# Patient Record
Sex: FEMALE | Race: WHITE | NOT HISPANIC OR LATINO | Employment: FULL TIME | URBAN - METROPOLITAN AREA
[De-identification: names, ages, dates, MRNs, and addresses within clinical notes are randomized per-mention and may not be internally consistent; named-entity substitution may affect disease eponyms.]

---

## 2021-05-26 ENCOUNTER — RECORDS - HEALTHEAST (OUTPATIENT)
Dept: ADMINISTRATIVE | Facility: CLINIC | Age: 71
End: 2021-05-26

## 2021-05-27 ENCOUNTER — RECORDS - HEALTHEAST (OUTPATIENT)
Dept: ADMINISTRATIVE | Facility: CLINIC | Age: 71
End: 2021-05-27

## 2021-12-22 ENCOUNTER — TRANSCRIBE ORDERS (OUTPATIENT)
Dept: OTHER | Age: 71
End: 2021-12-22

## 2021-12-22 DIAGNOSIS — I35.0 AORTIC STENOSIS, MODERATE: ICD-10-CM

## 2021-12-22 DIAGNOSIS — I25.10 CAD IN NATIVE ARTERY: Primary | ICD-10-CM

## 2021-12-28 ENCOUNTER — TRANSCRIBE ORDERS (OUTPATIENT)
Dept: OTHER | Age: 71
End: 2021-12-28

## 2022-01-14 ENCOUNTER — HOSPITAL ENCOUNTER (OUTPATIENT)
Dept: CARDIAC REHAB | Facility: CLINIC | Age: 72
End: 2022-01-14
Attending: INTERNAL MEDICINE
Payer: COMMERCIAL

## 2022-01-14 DIAGNOSIS — I35.0 AORTIC STENOSIS, MODERATE: ICD-10-CM

## 2022-01-14 DIAGNOSIS — I25.10 CAD IN NATIVE ARTERY: ICD-10-CM

## 2022-01-14 PROCEDURE — 93797 PHYS/QHP OP CAR RHAB WO ECG: CPT

## 2022-01-14 PROCEDURE — 93798 PHYS/QHP OP CAR RHAB W/ECG: CPT

## 2025-07-26 ENCOUNTER — APPOINTMENT (OUTPATIENT)
Dept: CT IMAGING | Facility: CLINIC | Age: 75
End: 2025-07-26
Payer: COMMERCIAL

## 2025-07-26 ENCOUNTER — APPOINTMENT (OUTPATIENT)
Dept: RADIOLOGY | Facility: CLINIC | Age: 75
End: 2025-07-26
Payer: COMMERCIAL

## 2025-07-26 ENCOUNTER — HOSPITAL ENCOUNTER (EMERGENCY)
Facility: CLINIC | Age: 75
Discharge: HOME | End: 2025-07-27
Attending: EMERGENCY MEDICINE | Admitting: EMERGENCY MEDICINE
Payer: COMMERCIAL

## 2025-07-26 ENCOUNTER — APPOINTMENT (OUTPATIENT)
Dept: RADIOLOGY | Facility: CLINIC | Age: 75
End: 2025-07-26
Attending: EMERGENCY MEDICINE
Payer: COMMERCIAL

## 2025-07-26 DIAGNOSIS — S82.851A CLOSED RIGHT TRIMALLEOLAR FRACTURE, INITIAL ENCOUNTER: Primary | ICD-10-CM

## 2025-07-26 PROCEDURE — 70450 CT HEAD/BRAIN W/O DYE: CPT

## 2025-07-26 PROCEDURE — 99285 EMERGENCY DEPT VISIT HI MDM: CPT | Mod: 25 | Performed by: EMERGENCY MEDICINE

## 2025-07-26 PROCEDURE — 250N000011 HC RX IP 250 OP 636: Performed by: EMERGENCY MEDICINE

## 2025-07-26 PROCEDURE — 27818 TREATMENT OF ANKLE FRACTURE: CPT | Mod: RT

## 2025-07-26 PROCEDURE — 96374 THER/PROPH/DIAG INJ IV PUSH: CPT

## 2025-07-26 PROCEDURE — 90715 TDAP VACCINE 7 YRS/> IM: CPT

## 2025-07-26 PROCEDURE — 250N000013 HC RX MED GY IP 250 OP 250 PS 637

## 2025-07-26 PROCEDURE — 999N000065 XR ANKLE RIGHT G/E 3 VIEWS: Mod: RT

## 2025-07-26 PROCEDURE — 999N000065 XR ANKLE RIGHT 2 VIEWS: Mod: RT

## 2025-07-26 PROCEDURE — 96376 TX/PRO/DX INJ SAME DRUG ADON: CPT

## 2025-07-26 PROCEDURE — 96375 TX/PRO/DX INJ NEW DRUG ADDON: CPT

## 2025-07-26 PROCEDURE — 73630 X-RAY EXAM OF FOOT: CPT | Mod: RT

## 2025-07-26 PROCEDURE — 72125 CT NECK SPINE W/O DYE: CPT

## 2025-07-26 PROCEDURE — 90471 IMMUNIZATION ADMIN: CPT

## 2025-07-26 PROCEDURE — 73700 CT LOWER EXTREMITY W/O DYE: CPT | Mod: RT

## 2025-07-26 PROCEDURE — 73562 X-RAY EXAM OF KNEE 3: CPT | Mod: RT

## 2025-07-26 PROCEDURE — 250N000011 HC RX IP 250 OP 636

## 2025-07-26 PROCEDURE — 73610 X-RAY EXAM OF ANKLE: CPT | Mod: RT

## 2025-07-26 RX ORDER — PROPOFOL 10 MG/ML
100 INJECTION, EMULSION INTRAVENOUS ONCE
Status: COMPLETED | OUTPATIENT
Start: 2025-07-26 | End: 2025-07-26

## 2025-07-26 RX ORDER — PROPOFOL 10 MG/ML
20 INJECTION, EMULSION INTRAVENOUS
Status: DISCONTINUED | OUTPATIENT
Start: 2025-07-26 | End: 2025-07-27 | Stop reason: HOSPADM

## 2025-07-26 RX ORDER — FENTANYL CITRATE 50 UG/ML
50 INJECTION, SOLUTION INTRAMUSCULAR; INTRAVENOUS ONCE
Refills: 0 | Status: COMPLETED | OUTPATIENT
Start: 2025-07-26 | End: 2025-07-26

## 2025-07-26 RX ORDER — METOPROLOL SUCCINATE 25 MG/1
25 TABLET, EXTENDED RELEASE ORAL DAILY
COMMUNITY
Start: 2024-12-27

## 2025-07-26 RX ORDER — BUPROPION HYDROCHLORIDE 150 MG/1
150 TABLET ORAL EVERY MORNING
COMMUNITY

## 2025-07-26 RX ORDER — EZETIMIBE 10 MG/1
10 TABLET ORAL DAILY
COMMUNITY

## 2025-07-26 RX ORDER — OXYCODONE HYDROCHLORIDE 5 MG/1
5 TABLET ORAL EVERY 6 HOURS PRN
Qty: 12 TABLET | Refills: 0 | Status: SHIPPED | OUTPATIENT
Start: 2025-07-26 | End: 2025-07-29

## 2025-07-26 RX ORDER — HYDROMORPHONE HYDROCHLORIDE 1 MG/ML
0.5 INJECTION, SOLUTION INTRAMUSCULAR; INTRAVENOUS; SUBCUTANEOUS EVERY 30 MIN PRN
Refills: 0 | Status: COMPLETED | OUTPATIENT
Start: 2025-07-26 | End: 2025-07-26

## 2025-07-26 RX ORDER — COVID-19 ANTIGEN TEST
220 KIT MISCELLANEOUS 2 TIMES DAILY PRN
COMMUNITY

## 2025-07-26 RX ORDER — GABAPENTIN 300 MG/1
300 CAPSULE ORAL AT BEDTIME
COMMUNITY

## 2025-07-26 RX ORDER — OXYCODONE HYDROCHLORIDE 5 MG/1
5 TABLET ORAL ONCE
Refills: 0 | Status: COMPLETED | OUTPATIENT
Start: 2025-07-26 | End: 2025-07-26

## 2025-07-26 RX ORDER — FENTANYL CITRATE 50 UG/ML
50 INJECTION, SOLUTION INTRAMUSCULAR; INTRAVENOUS
Refills: 0 | Status: DISCONTINUED | OUTPATIENT
Start: 2025-07-26 | End: 2025-07-27 | Stop reason: HOSPADM

## 2025-07-26 RX ORDER — FLUMAZENIL 0.1 MG/ML
0.2 INJECTION, SOLUTION INTRAVENOUS
Status: DISCONTINUED | OUTPATIENT
Start: 2025-07-26 | End: 2025-07-27 | Stop reason: HOSPADM

## 2025-07-26 RX ORDER — ROSUVASTATIN CALCIUM 40 MG/1
40 TABLET, COATED ORAL DAILY
COMMUNITY

## 2025-07-26 RX ORDER — LISINOPRIL AND HYDROCHLOROTHIAZIDE 20; 25 MG/1; MG/1
1 TABLET ORAL DAILY
COMMUNITY

## 2025-07-26 RX ORDER — NICOTINE 21 MG/24HR
1 PATCH, TRANSDERMAL 24 HOURS TRANSDERMAL DAILY
Status: DISCONTINUED | OUTPATIENT
Start: 2025-07-26 | End: 2025-07-27 | Stop reason: HOSPADM

## 2025-07-26 RX ADMIN — HYDROMORPHONE HYDROCHLORIDE 0.5 MG: 1 INJECTION, SOLUTION INTRAMUSCULAR; INTRAVENOUS; SUBCUTANEOUS at 19:46

## 2025-07-26 RX ADMIN — FENTANYL CITRATE 50 MCG: 50 INJECTION INTRAMUSCULAR; INTRAVENOUS at 22:47

## 2025-07-26 RX ADMIN — NICOTINE 1 PATCH: 14 PATCH, EXTENDED RELEASE TRANSDERMAL at 21:48

## 2025-07-26 RX ADMIN — HYDROMORPHONE HYDROCHLORIDE 0.5 MG: 1 INJECTION, SOLUTION INTRAMUSCULAR; INTRAVENOUS; SUBCUTANEOUS at 19:10

## 2025-07-26 RX ADMIN — OXYCODONE HYDROCHLORIDE 5 MG: 5 TABLET ORAL at 17:23

## 2025-07-26 RX ADMIN — CLOSTRIDIUM TETANI TOXOID ANTIGEN (FORMALDEHYDE INACTIVATED), CORYNEBACTERIUM DIPHTHERIAE TOXOID ANTIGEN (FORMALDEHYDE INACTIVATED), BORDETELLA PERTUSSIS TOXOID ANTIGEN (GLUTARALDEHYDE INACTIVATED), BORDETELLA PERTUSSIS FILAMENTOUS HEMAGGLUTININ ANTIGEN (FORMALDEHYDE INACTIVATED), BORDETELLA PERTUSSIS PERTACTIN ANTIGEN, AND BORDETELLA PERTUSSIS FIMBRIAE 2/3 ANTIGEN 0.5 ML: 5; 2; 2.5; 5; 3; 5 INJECTION, SUSPENSION INTRAMUSCULAR at 19:50

## 2025-07-26 RX ADMIN — PROPOFOL 100 MG: 10 INJECTION, EMULSION INTRAVENOUS at 22:29

## 2025-07-26 ASSESSMENT — COLUMBIA-SUICIDE SEVERITY RATING SCALE - C-SSRS
2. HAVE YOU ACTUALLY HAD ANY THOUGHTS OF KILLING YOURSELF IN THE PAST MONTH?: NO
1. IN THE PAST MONTH, HAVE YOU WISHED YOU WERE DEAD OR WISHED YOU COULD GO TO SLEEP AND NOT WAKE UP?: NO
6. HAVE YOU EVER DONE ANYTHING, STARTED TO DO ANYTHING, OR PREPARED TO DO ANYTHING TO END YOUR LIFE?: NO

## 2025-07-26 ASSESSMENT — ACTIVITIES OF DAILY LIVING (ADL)
ADLS_ACUITY_SCORE: 41

## 2025-07-26 NOTE — ED PROVIDER NOTES
"Emergency Department Midlevel Supervisory Note     I had a face to face encounter with this patient seen by the Zohreh Franco PA-C. I personally made/approved the management plan and take responsibility for the patient management. I personally saw patient and performed a substantive portion of the visit including all aspects of the medical decision making.     ED Course:  5:04 PM  Zohreh Franco PA-C staffed patient with me. I agree with their assessment and plan of management, and I will see the patient.  6:28 PM  I met with the patient to introduce myself, gather additional history, perform my initial exam, and discuss the plan.   8:10 PM I assisted with splint placement      Brief HPI:     Esther Faye is a 74 year old female who presents for evaluation of fall.    Around 1530 today, patient fell and rolled down a hill. Patient does not recall if she hit her head, but stated it was likely. Patients family witnessed the fall and stated it seemed as though she just lost her balance, and that she did not lose consciousness. Patient is on eliquis.     I, Kiley Tang, am serving as a scribe to document services personally performed by Constance Colon MD, based on my observations and the provider's statements to me.   I, Constance Colon MD attest that Kiley Tang was acting in a scribe capacity, has observed my performance of the services and has documented them in accordance with my direction.    Physical Exam: BP (!) 179/79   Pulse 86   Temp 98.3  F (36.8  C) (Oral)   Resp 20   Ht 1.753 m (5' 9\")   Wt 113.4 kg (250 lb)   SpO2 93%   BMI 36.92 kg/m    Constitutional:  in nad, pleasant overweight female lying back in bed  Eyes:  PERRLA bilaterally, no hyphema, no diplopia,    HENT:  NCAT, canals clear, no lacerations, no hemotympanum, no epistaxis, no septal hematoma, oropharynx clear, no blood in posterior pharynx, teeth intact, trachea midline    Spine:  no C-Collar, patient denies back pain when I " have her sit forwards, back palpated and nontender to palpation throughout  Respiratory:  Clear to auscultation, equal breath sounds bilaterally, no subcutaneous air, atraumatic chest wall, stable chest wall to ap and lateral palpation,     Cardiovascular:   RRR S1 S2, no murmurs or friction rubs, No JVD, pulses are equal and symmetrically strong in all extremities  Abdomen:  Soft, Non-distended, non-tender, atraumatic,  Pelvis:  Stable, nontender to ap and lateral compression and to rock.     Musculoskeletal:  r ankle swollen without open wound.  She can move her toes without difficulty and notes normal sensation in her toes.  Proximal tib/fib at knee are nontender to palpation.  Patella nontender. Can bend knee without knee pain when we go to splint her ankle without knee pain.   Integument:  abrasion superficial r knee and left shin  Neurologic:  Awake, Alert, and Oriented x3, GCS 15, diffusely normal sensation including perirectally, spontaneously able to move all extremities        MDM:  Patient was seen after her imaging was done.  She does have an isolated right trimalleolar fracture.  Otherwise her knee is bothersome to her but there is no fracture seen there.  She is inclined to want to discharge home and feels that she could be nonweightbearing with crutches on the right leg.  She does understand that we will need to get the ankle very well aligned and that it could still slip out of place so whether or not she goes home will be dependent on how the ankle sits once placed in the splint and our discussion with orthopedics.    Otherwise she had a simple mechanical fall and imaging of her head was done because she is on chronic blood thinners for valve replacement.  This will complicate her operative repair plan in terms of timing and the need to bridge with an alternate blood thinner.  This will have to be discussed with cardiology.  Otherwise CT imaging today of her head and shows no other intracranial  bleeding.    No diagnosis found.    Consults:  I discussed the patient with [facility] Woodrow Sims MD who recommends second attempt at reduction and offered to come in to do the second attempt which we greatly appreciate his help with.    Labs and Imaging:  Results for orders placed or performed during the hospital encounter of 07/26/25   CT Head w/o Contrast    Impression    IMPRESSION:  1.  No CT evidence for acute intracranial process.  2.  Mild chronic microvascular ischemic changes as above.     CT Cervical Spine w/o Contrast    Impression    IMPRESSION:  1.  No fracture or posttraumatic subluxation.     Ankle XR, G/E 3 views, right    Impression    IMPRESSION: Trimalleolar fracture of the ankle. Medial talar tilt and asymmetry of ankle mortise suggestive of instability. Surrounding soft tissue swelling. The right foot is negative for fracture. Marginal pes cavus. Achilles calcaneal spurring.   Hammertoe deformities.   Foot  XR, G/E 3 views, right    Impression    IMPRESSION: Trimalleolar fracture of the ankle. Medial talar tilt and asymmetry of ankle mortise suggestive of instability. Surrounding soft tissue swelling. The right foot is negative for fracture. Marginal pes cavus. Achilles calcaneal spurring.   Hammertoe deformities.   XR Knee Right 3 Views    Impression    IMPRESSION: Degenerative narrowing of the medial compartment. Degenerative change patellofemoral compartment. No evidence for acute fracture. Chronic enthesitis superior pole of the patella. Tiny effusion. There are some calcifications posterior to the   knee which may be loose within the joint but appear corticated and chronic.   Ankle XR, G/E 3 views, right    Impression    IMPRESSION: Interval placement of splinting material about the right ankle. Redemonstrated trimalleolar fracture. Slight lateral subluxation of the talus relative to the tibial plafond.       I have reviewed the relevant laboratory studies above.    I independently  interpreted the following imaging study(s):   Xray ankle, and ct imaging head and cspine.    Procedures:  I was present for the key portions of procedures documented in CK/midlevel note, see midlevel note for further details.    PROCEDURE: Procedural Sedation  Orthopedic Injury Reduction   INDICATIONS: Sedation is required to allow for muscle relaxation to get good alignment of fracture dislocation reduction.   SEDATION PROVIDER: Dr Constance Colon   PROCEDURE PROVIDER: Dr Constance Colon   LEVEL OF SEDATION: Deep Sedation    Defined as:  Minimal = Normal response to verbal  Moderate = Responds to verbal and light tactile stimulation  Deep = Responds after repeated painful stimulation   CONSENT: Risks, benefits and alternatives were discussed with and Written consent was obtained from Patient.   PROCEDURE SPECIFIC CHECKLIST COMPLETED:   Yes   LAST ORAL INTAKE: Regular Meal > 8 hours   ASA CLASS: 3 - Severe systemic disease, but not incapacitating   MALLAMPATI:  II - Faucial pillars and soft palate may be seen, but uvula is masked by the base of the tongue   TIME OUT: Universal protocol was followed. TIME OUT conducted just prior to starting procedure confirmed patient identity, site/side, procedure, patient position, and availability of correct equipment. Yes    Immediately prior to initiation of sedation, reassessment of clinical condition was performed which was  unchanged.   MEDICATIONS: Propofol, 100 mg, IV   MONITORING: Monitoring consisted of:   heart rate, cardiac monitor, continuous pulse oximeter, continuous capnometry (end tidal CO2), frequent blood pressure checks, level of consciousness checks, IV access, constant attendance by RN until patient is recovered, constant attendance by MD until patient is stable, and intubation and emergency airway equipment available   RESPONSE: vital signs stable, airway patent, positioning required to maintain patent airway, jaw thrust required to maintain patent airway,  and desaturations (note interventions)    Patient continued to have good respiratory rate but due to body habitus was not ventilating well enough after the propofol and so was noted to have some increasing CO2 and so we did place an oral airway, and assisted in ventilations.  Again she maintained good respiratory rate so I was assisting in ventilations with bag-valve-mask through the oral airway.  Lowest oxygen saturation was 89%.  She quickly rebounded with the BVM assisted ventilations up into the upper 98 percentile and then maintained.  End-tidal CO2 would occasionally go up towards 50 even as she was more awake in recovery she would take deep breaths and that would improved to the mid to lower 40s but had no further desaturations.                         Constance Colon MD  [facility] EMERGENCY ROOM  [address] [id number] [phone]          Constance Colon MD  07/26/25 8145

## 2025-07-26 NOTE — PHARMACY-ADMISSION MEDICATION HISTORY
Pharmacist Admission Medication History    Admission medication history is complete. The information provided in this note is only as accurate as the sources available at the time of the update.    Information Source(s): Patient and CareEverywhere/SureScripts via in-person    Pertinent Information:  Patient takes apixaban 5 mg twice daily. Missed 7/25 PM and 7/26 AM doses. Not on dispense report, obtains from [facility].   Takes Aleve for HA but this morning instead, took 2 Tylenol. Didn't take any prescription meds today.     Changes made to PTA medication list:  Added: apixaban, amitriptyline, bupropion XL, ezetimibe, lisinopril-hydrochlorothiazide, metoprolol XL, rosuvastatin, Ozempic 4mg/3ml , gabapentin, Aleve  Deleted: atorvastatin, ibuprofen, lisinopril, confirmed no longer taking metformin  Changed: None    Allergies reviewed with patient and updates made in EHR: yes    Medication History Completed By: Coleen Pinedo RP 7/26/2025 5:30 PM    PTA Med List   Medication Sig Note Last Dose/Taking    amitriptyline (ELAVIL) 25 MG tablet Take 25 mg by mouth at bedtime.  7/24/2025 Bedtime    apixaban ANTICOAGULANT (ELIQUIS) 5 MG tablet Take 5 mg by mouth 2 times daily. 7/26/2025: Missed 7/25 PM and 7/26 AM doses. Not on dispense report, obtains from [facility].  7/25/2025 Morning    buPROPion (WELLBUTRIN XL) 150 MG 24 hr tablet Take 150 mg by mouth every morning.  7/25/2025 Morning    ezetimibe (ZETIA) 10 MG tablet Take 10 mg by mouth daily.  7/25/2025 Morning    gabapentin (NEURONTIN) 300 MG capsule Take 300 mg by mouth at bedtime.  Past Month    lisinopril-hydrochlorothiazide (ZESTORETIC) 20-25 MG tablet Take 1 tablet by mouth daily.  7/25/2025 Morning    metoprolol succinate ER (TOPROL XL) 25 MG 24 hr tablet Take 25 mg by mouth daily.  7/25/2025 Morning    naproxen sodium 220 MG capsule Take 220 mg by mouth 2 times daily as needed (headache).  Unknown    rosuvastatin (CRESTOR) 40 MG tablet Take 40 mg by mouth daily.   7/25/2025 Morning    Semaglutide, 1 MG/DOSE, (OZEMPIC) 4 MG/3ML pen Inject 1 mg subcutaneously once a week. Wednesdays 7/26/2025: Missed this past week on Wed, 7/23/25. 7/16/2025

## 2025-07-26 NOTE — ED PROVIDER NOTES
Emergency Department Encounter   NAME: Esther Faye ; AGE: 74 year old female ; YOB: 1950 ; MRN: [id number] ; PCP: Priti Lau   ED PROVIDER: Zohreh Franco PA-C    Evaluation Date & Time:   7/26/2025  4:54 PM    CHIEF COMPLAINT:  Fall      Impression and Plan   MDM: Esther Faye is a 74 year old female who presents to the ED for evaluation of Fall. Patient states she was walking into her cabin roughly 2 hours ago while carrying things when she lost her balance and fell backwards, falling back down the hill. Reports she log rolled down. Does take Eliquis, but no loss of consciousness.  Reports right foot and ankle pain, was unable to walk after the injury. Denies any chest pain, shortness of breath, abdominal pain since. Denies headache, vision changes, or slurred speech since accident.     Patient is vitally normal, no acute distress. Physical exam is significant for notable abrasions on the anterior bilateral knees, no active bleeding in the areas.  There is tenderness to palpation over the fibular head on the lateral aspect of the right leg as well as tenderness throughout the right ankle and foot. Good distal pulses. Differential diagnosis includes foot fracture, ankle fracture, dislocation, Maisonneuve fracture.     I independently reviewed and interpreted imaging, Xrs show a trimalleolar fracture of the right ankle with instability.     Minimal relief after oral oxycodone, placed ED for Dilaudid for pain control.  Did work to reduce and splint right lower extremity with posterior slab plus stirrup splint, see procedure note for details.  Unfortunately postreduction x-ray films show no improvement in the fracture overall.  Spoke with Dr. Sims from [facility] who states that we will need second reduction attempt, offered to come in and complete this himself with sedation in the ED.    Second reduction attempt completed under sedation. Dr. Sims endorses improved alignment on  post-reduction films and states as long as patient can tolerate crutches and NWB status, is stable for outpatient follow up for surgical discussion, but does request a CT of the ankle for surgical planning, which I am happy to get.  I discussed this with patient who is eager to return home.  Patient had a successful ambulation trial with crutches, send prescription of oxycodone for pain management to patient's preferred pharmacy, also discussed Tylenol and elevation and ice which she is agreeable with.  I did stress the importance of her reaching out to her cardiologist on Monday to discuss Eliquis in regards to surgical planning and she states she will.  At time my last evaluation, patient is stable for discharge home.    Return precautions to the ED were discussed, patient verbalized understanding and were agreeable with the plan. All questions were answered.     Per chart review:  - Last seen by PCP at [facility] on 1/3/2025  - Recent labs and imaging reviewed  - Care everywhere reviewed    Medical Decision Making      Discharge. I prescribed additional prescription strength medication(s) as charted. N/A.    MIPS:  Not Applicable    SEPSIS: None        ED COURSE:  4:44 PM I met and introduced myself to the patient. I gathered initial history and performed my physical exam. We discussed plan for initial workup.   5:04 PM PM I have staffed the patient with Dr. Colon, ED MD, who has evaluated the patient and agrees with all aspects of today's care.   9:43 PM Spoke with Dr. Sims, orthopedics, who states ankle will need second reduction attempt, he will come in for reduction.   12:12 AM I rechecked the patient and discussed results, discharge, follow up, and reasons to return to the ED.       FINAL IMPRESSION:    ICD-10-CM    1. Closed right trimalleolar fracture, initial encounter  S82.851A             MEDICATIONS GIVEN IN THE EMERGENCY DEPARTMENT:  Medications   nicotine (NICODERM CQ) 14 MG/24HR 24 hr patch 1 patch  (1 patch Transdermal $Patch/Med Applied 7/26/25 2148)   flumazenil (ROMAZICON) injection 0.2 mg (has no administration in time range)   propofol (DIPRIVAN) injection 10 mg/mL vial (has no administration in time range)   fentaNYL (PF) (SUBLIMAZE) injection 50 mcg (has no administration in time range)   oxyCODONE (ROXICODONE) tablet 5 mg (5 mg Oral $Given 7/26/25 1723)   HYDROmorphone (PF) (DILAUDID) injection 0.5 mg (0.5 mg Intravenous Not Given 7/26/25 2109)   Tdap (tetanus-diphtheria-acell pertussis) (ADACEL) injection 0.5 mL (0.5 mLs Intramuscular $Given 7/1950)   propofol (DIPRIVAN) injection 10 mg/mL vial (100 mg Intravenous $Given 7/26/25 2229)   fentaNYL (PF) (SUBLIMAZE) injection 50 mcg (50 mcg Intravenous $Given 7/26/25 2247)         NEW PRESCRIPTIONS STARTED AT TODAY'S ED VISIT:  New Prescriptions    OXYCODONE (ROXICODONE) 5 MG TABLET    Take 1 tablet (5 mg) by mouth every 6 hours as needed for pain.         HPI   Use of Intrepreter: N/A     Esther Faye is a 74 year old female with a pertinent history of COPD mixed type, CAD in native artery, essential HTN, primary osteoarthritis of knee and depression who presents to the ED for evaluation of fall. Patient reports that at 3 PM she was walking up the hill to her cabin, when she loss her balance causing her to fall backwards and log roll several times down the hill. Patient isn't sure if she hit her head when she fell. Patient denies LOC, vision changes, neck pain and slurred words. Patient states she heard a crack noise. Patient endorses right ankle pain and has bilateral abrasions to knees. Patient is currently on Eliquis.      REVIEW OF SYSTEMS:  Pertinent positive and negative symptoms per HPI.       Medical History     No past medical history on file.    No past surgical history on file.    No family history on file.    Social History     Tobacco Use    Smoking status: Every Day       amitriptyline (ELAVIL) 25 MG tablet  apixaban ANTICOAGULANT  "(ELIQUIS) 5 MG tablet  buPROPion (WELLBUTRIN XL) 150 MG 24 hr tablet  ezetimibe (ZETIA) 10 MG tablet  gabapentin (NEURONTIN) 300 MG capsule  lisinopril-hydrochlorothiazide (ZESTORETIC) 20-25 MG tablet  metoprolol succinate ER (TOPROL XL) 25 MG 24 hr tablet  naproxen sodium 220 MG capsule  oxyCODONE (ROXICODONE) 5 MG tablet  rosuvastatin (CRESTOR) 40 MG tablet  Semaglutide, 1 MG/DOSE, (OZEMPIC) 4 MG/3ML pen          Physical Exam     First Vitals:  Patient Vitals for the past 24 hrs:   BP Temp Temp src Pulse Resp SpO2 Height Weight   07/26/25 2325 131/60 -- -- 82 18 99 % -- --   07/26/25 2320 133/63 -- -- 79 22 97 % -- --   07/26/25 2315 -- -- -- -- 24 -- -- --   07/26/25 2315 133/63 -- -- 80 22 99 % -- --   07/26/25 2310 -- -- -- -- 21 -- -- --   07/26/25 2310 (!) 159/67 -- -- 79 20 98 % -- --   07/26/25 2305 -- -- -- -- 19 -- -- --   07/26/25 2305 (!) 160/71 -- -- 78 20 98 % -- --   07/26/25 2300 137/64 -- -- 78 27 98 % -- --   07/26/25 2300 -- -- -- -- 14 -- -- --   07/26/25 2255 -- -- -- -- 14 -- -- --   07/26/25 2255 137/64 98.8  F (37.1  C) Oral 77 29 97 % -- --   07/26/25 2250 (!) 165/69 -- -- 78 13 99 % -- --   07/26/25 2250 -- -- -- -- 24 -- -- --   07/26/25 2245 (!) 143/64 -- -- 76 12 99 % -- --   07/26/25 2245 -- -- -- -- 13 -- -- --   07/26/25 2245 -- -- -- 77 -- -- -- --   07/26/25 2241 -- -- -- -- 17 -- -- --   07/26/25 2240 (!) 159/69 -- -- 75 19 100 % -- --   07/26/25 2235 -- -- -- -- 24 -- -- --   07/26/25 2235 (!) 167/72 -- -- 76 17 100 % -- --   07/26/25 2233 -- -- -- -- 24 -- -- --   07/26/25 2230 135/61 -- -- 78 (!) 35 (!) 89 % -- --   07/26/25 2220 (!) 166/109 98.8  F (37.1  C) Oral (!) 126 18 94 % -- --   07/26/25 2130 (!) 179/79 -- -- 86 -- 93 % -- --   07/26/25 2100 (!) 159/71 -- -- 86 -- 93 % -- --   07/26/25 2001 (!) 169/78 -- -- 88 -- 94 % -- --   07/26/25 1651 127/85 98.3  F (36.8  C) Oral 86 20 94 % 1.753 m (5' 9\") 113.4 kg (250 lb)         PHYSICAL EXAM:   Physical Exam  Constitutional: "       General: She is not in acute distress.     Appearance: She is well-developed. She is not ill-appearing.   HENT:      Head: Normocephalic.      Nose: Nose normal. No congestion.      Mouth/Throat:      Mouth: Mucous membranes are moist.   Eyes:      Conjunctiva/sclera: Conjunctivae normal.   Cardiovascular:      Rate and Rhythm: Normal rate and regular rhythm.   Pulmonary:      Effort: Pulmonary effort is normal.      Breath sounds: Normal breath sounds.   Abdominal:      General: Abdomen is flat.      Palpations: There is no mass.      Tenderness: There is no abdominal tenderness. There is no guarding.   Musculoskeletal:      Cervical back: Neck supple.      Right knee: No swelling or bony tenderness. Normal range of motion. No tenderness. Normal pulse.      Right lower leg: No swelling, tenderness or bony tenderness. No edema.      Right ankle: Swelling present. Tenderness present over the lateral malleolus, medial malleolus and proximal fibula. Decreased range of motion. Normal pulse.      Right foot: Decreased range of motion. Tenderness and bony tenderness present. No swelling.   Skin:     General: Skin is warm.      Capillary Refill: Capillary refill takes less than 2 seconds.   Neurological:      General: No focal deficit present.      Mental Status: She is alert and oriented to person, place, and time.   Psychiatric:         Mood and Affect: Mood normal.         Behavior: Behavior normal.             Results     LAB:  All pertinent labs reviewed and interpreted  Labs Ordered and Resulted from Time of ED Arrival to Time of ED Departure - No data to display    RADIOLOGY:  CT Ankle Right w/o Contrast   Final Result   IMPRESSION:   1.  Comminuted, minimally displaced trimalleolar right ankle fractures.            XR Ankle Right 2 Views   Final Result   IMPRESSION: Trimalleolar fracture is not well visualized due to overlying cast material. Overall alignment is improved compared to earlier exams.      Ankle  XR, G/E 3 views, right   Final Result   IMPRESSION: Interval placement of splinting material about the right ankle. Redemonstrated trimalleolar fracture. Slight lateral subluxation of the talus relative to the tibial plafond.      CT Cervical Spine w/o Contrast   Final Result   IMPRESSION:   1.  No fracture or posttraumatic subluxation.         CT Head w/o Contrast   Final Result   IMPRESSION:   1.  No CT evidence for acute intracranial process.   2.  Mild chronic microvascular ischemic changes as above.         XR Knee Right 3 Views   Final Result   IMPRESSION: Degenerative narrowing of the medial compartment. Degenerative change patellofemoral compartment. No evidence for acute fracture. Chronic enthesitis superior pole of the patella. Tiny effusion. There are some calcifications posterior to the    knee which may be loose within the joint but appear corticated and chronic.      Foot  XR, G/E 3 views, right   Final Result   IMPRESSION: Trimalleolar fracture of the ankle. Medial talar tilt and asymmetry of ankle mortise suggestive of instability. Surrounding soft tissue swelling. The right foot is negative for fracture. Marginal pes cavus. Achilles calcaneal spurring.    Hammertoe deformities.      Ankle XR, G/E 3 views, right   Final Result   IMPRESSION: Trimalleolar fracture of the ankle. Medial talar tilt and asymmetry of ankle mortise suggestive of instability. Surrounding soft tissue swelling. The right foot is negative for fracture. Marginal pes cavus. Achilles calcaneal spurring.    Hammertoe deformities.            ECG:  None    PROCEDURES:  PROCEDURE: Splint Placement   INDICATIONS: right trimalleolar fracture   PROCEDURE PROVIDER: Zohreh Franco PA-C   NOTE:  A Posterior slab (short leg) with Stirrup splint made of Plaster was applied to the Left lower extremity by the above provider. As noted in the physical exam, distal CMS was intact prior to placement. The splint was checked and the fit was adjusted  to ensure proper positioning after placement. Sensation and circulation, as well as motor function, are unchanged after splint placement and the patient is more comfortable with the splint in place.          I, Mp Urbina, am serving as a scribe to document services personally performed by Zohreh Franco PA-C, based on my observation and the provider's statements to me. I, Zohreh Franco PA-C attest that Mp Urbina is acting in a scribe capacity, has observed my performance of the services and has documented them in accordance with my direction.       Zohreh Franco PA-C   Emergency Medicine   [facility] EMERGENCY ROOM       Zohreh Franco PA-C  07/27/25 0018

## 2025-07-27 VITALS
OXYGEN SATURATION: 99 % | BODY MASS INDEX: 37.03 KG/M2 | HEIGHT: 69 IN | TEMPERATURE: 98.8 F | RESPIRATION RATE: 18 BRPM | WEIGHT: 250 LBS | SYSTOLIC BLOOD PRESSURE: 131 MMHG | DIASTOLIC BLOOD PRESSURE: 60 MMHG | HEART RATE: 82 BPM

## 2025-07-27 NOTE — DISCHARGE INSTRUCTIONS
You have a trimalleolar fracture of your right ankle. You were placed into a splint and crutches for ambulation. [facility] will call you on Monday morning to schedule an appointment to discuss next steps.    Pain Management:  - Tylenol 100mg every 8 hours  - Oxycodone every 6 hours as needed  - Ice and elevate often as possible    Reach out to your cardiology team on Monday morning to discuss your Eliquis and surgery.

## 2025-07-27 NOTE — CONSULTS
ORTHOPEDIC CONSULTATION    CHIEF COMPLAINT: Right ankle pain      HISTORY OF PRESENT ILLNESS:  The patient is seen in orthopedic consultation at the request of Eduardo Downing MD.  The patient is a 74 year old female with c/o right ankle pain.  The patient fell while walking up a hill earlier today 7/26/2025.  She twisted her right ankle.  She had pain and was unable to weight-bear.  She was brought to the emergency department and diagnosed with an ankle fracture.  She was splinted, but due to persistent displacement of the fracture, orthopedics was consulted for further evaluation and management.  The patient has diabetes mellitus type 2, but reports her most recent hemoglobin A1c was under 7.  She has mild intermittent tingling in her feet, but reports protective sensation.  She is otherwise fairly healthy and active.  She does take Eliquis and medication for hypertension.      PAST MEDICAL HISTORY:   Diabetes mellitus type 2  Anticoagulated on Eliquis  Hypertension    ALLERGIES:    No Known Allergies     MEDICATIONS ON ADMISSION:  Medications were reviewed.       SOCIAL HISTORY:   Social History     Tobacco Use    Smoking status: Every Day        FAMILY HISTORY:  No family history on file.    REVIEW OF SYSTEMS:   Negative unless noted above in the HPI.    PHYSICAL EXAMINATION:    Temp:  [98.3  F (36.8  C)-98.8  F (37.1  C)] 98.8  F (37.1  C)  Pulse:  [] 82  Resp:  [12-35] 18  BP: (127-179)/() 131/60  SpO2:  [89 %-100 %] 99 %    General: On examination, the patient is A&Ox3  Neuro: Patient is alert and interactive, no obvious deficit  Cardiovascular: Extremities are warm and well-perfused  Respiratory: Breathing nonlabored on room air  HEENT: Normal  Right lower extremity: Focused examination of the right lower extremity reveals a short leg splint in place.  It was removed, the patient's skin is intact.  There is moderate swelling about the right ankle.  The patient is able to wiggle her toes  "firing EHL and FHL.  Sensation intact to light touch at the DP/SP/tibial nerve distributions.  She has a palpable DP pulse, her foot is warm and well-perfused.    RADIOGRAPHIC EVALUATION:  Injury x-rays of the right foot and ankle are personally reviewed.  There is a trimalleolar ankle fracture with with a very large posterior malleolar component.  There is lateral subluxation of the talus but no posterior displacement.  No fracture is noted about the right foot.  Post splinting films are also reviewed showing persistent lateral displacement of the talus, and posterior subluxation of the talus in relation to the tibia.      LABORATORY DATA:   No results found for: \"INR\"    IMPRESSION:  74-year-old female with right trimalleolar ankle fracture subluxation, continued poor alignment of the ankle after splinting in the emergency department.     PLAN:  I discussed with the patient my findings today.  My recommendation is for closed reduction with conscious sedation.  We discussed the risk, benefits, and alternatives.  We discussed that ultimately her ankle fracture will require surgery for fixation.  She elected to proceed.    Procedure: A timeout was performed with all members of the team participating per hospital policy.  The correct patient, site, and procedure were confirmed.  Conscious sedation was per the emergency department provider.  After adequate anesthesia was obtained, the previous splint was removed.  The ankle was then reduced with a Bri maneuver.  A well-padded appropriately molded short leg splint was applied.  The patient was awakened and tolerated the procedure well.  Postreduction films confirmed appropriate alignment of the ankle.    I have asked for a CT scan of the right ankle for preoperative planning.  The patient can be road tested and if she is able to maintain nonweightbearing restrictions, she can discharge to home.  If not she could be admitted and we can plan surgical fixation in the " next few days.  If she discharges home, my team will be in touch with the patient for follow-up.  She was in agreement with this plan, and all questions were answered.    Cash Sims MD  [facility]

## 2025-07-27 NOTE — SEDATION DOCUMENTATION
Patient had a closed reduction on right ankle under conscious sedation with completed procedural consent.

## 2025-08-04 ENCOUNTER — ANESTHESIA EVENT (OUTPATIENT)
Dept: SURGERY | Facility: CLINIC | Age: 75
End: 2025-08-04
Payer: COMMERCIAL

## 2025-08-05 ENCOUNTER — ANESTHESIA (OUTPATIENT)
Dept: SURGERY | Facility: CLINIC | Age: 75
End: 2025-08-05
Payer: COMMERCIAL

## 2025-08-05 ENCOUNTER — HOSPITAL ENCOUNTER (OUTPATIENT)
Facility: CLINIC | Age: 75
Discharge: HOME | End: 2025-08-05
Attending: STUDENT IN AN ORGANIZED HEALTH CARE EDUCATION/TRAINING PROGRAM | Admitting: STUDENT IN AN ORGANIZED HEALTH CARE EDUCATION/TRAINING PROGRAM
Payer: COMMERCIAL

## 2025-08-05 ENCOUNTER — APPOINTMENT (OUTPATIENT)
Dept: RADIOLOGY | Facility: CLINIC | Age: 75
End: 2025-08-05
Attending: STUDENT IN AN ORGANIZED HEALTH CARE EDUCATION/TRAINING PROGRAM
Payer: COMMERCIAL

## 2025-08-05 VITALS
OXYGEN SATURATION: 94 % | RESPIRATION RATE: 16 BRPM | DIASTOLIC BLOOD PRESSURE: 59 MMHG | WEIGHT: 254 LBS | HEART RATE: 69 BPM | HEIGHT: 69 IN | SYSTOLIC BLOOD PRESSURE: 117 MMHG | BODY MASS INDEX: 37.62 KG/M2 | TEMPERATURE: 97 F

## 2025-08-05 DIAGNOSIS — S82.831A CLOSED FRACTURE OF RIGHT TIBIAL PLAFOND WITH FIBULA INVOLVEMENT, INITIAL ENCOUNTER: Primary | ICD-10-CM

## 2025-08-05 DIAGNOSIS — S82.871A CLOSED FRACTURE OF RIGHT TIBIAL PLAFOND WITH FIBULA INVOLVEMENT, INITIAL ENCOUNTER: Primary | ICD-10-CM

## 2025-08-05 LAB
GLUCOSE BLDC GLUCOMTR-MCNC: 126 MG/DL (ref 70–99)
GLUCOSE BLDC GLUCOMTR-MCNC: 177 MG/DL (ref 70–99)

## 2025-08-05 PROCEDURE — 710N000012 HC RECOVERY PHASE 2, PER MINUTE: Performed by: STUDENT IN AN ORGANIZED HEALTH CARE EDUCATION/TRAINING PROGRAM

## 2025-08-05 PROCEDURE — 250N000009 HC RX 250: Performed by: NURSE ANESTHETIST, CERTIFIED REGISTERED

## 2025-08-05 PROCEDURE — 250N000013 HC RX MED GY IP 250 OP 250 PS 637: Performed by: STUDENT IN AN ORGANIZED HEALTH CARE EDUCATION/TRAINING PROGRAM

## 2025-08-05 PROCEDURE — 250N000011 HC RX IP 250 OP 636: Performed by: STUDENT IN AN ORGANIZED HEALTH CARE EDUCATION/TRAINING PROGRAM

## 2025-08-05 PROCEDURE — C1713 ANCHOR/SCREW BN/BN,TIS/BN: HCPCS | Performed by: STUDENT IN AN ORGANIZED HEALTH CARE EDUCATION/TRAINING PROGRAM

## 2025-08-05 PROCEDURE — 250N000011 HC RX IP 250 OP 636: Performed by: ANESTHESIOLOGY

## 2025-08-05 PROCEDURE — 272N000001 HC OR GENERAL SUPPLY STERILE: Performed by: STUDENT IN AN ORGANIZED HEALTH CARE EDUCATION/TRAINING PROGRAM

## 2025-08-05 PROCEDURE — 999N000180 XR SURGERY CARM FLUORO LESS THAN 5 MIN

## 2025-08-05 PROCEDURE — 82962 GLUCOSE BLOOD TEST: CPT

## 2025-08-05 PROCEDURE — 64447 NJX AA&/STRD FEMORAL NRV IMG: CPT | Mod: XU,RT

## 2025-08-05 PROCEDURE — 250N000009 HC RX 250: Performed by: STUDENT IN AN ORGANIZED HEALTH CARE EDUCATION/TRAINING PROGRAM

## 2025-08-05 PROCEDURE — 999N000141 HC STATISTIC PRE-PROCEDURE NURSING ASSESSMENT: Performed by: STUDENT IN AN ORGANIZED HEALTH CARE EDUCATION/TRAINING PROGRAM

## 2025-08-05 PROCEDURE — 272N000002 HC OR SUPPLY OTHER OPNP: Performed by: STUDENT IN AN ORGANIZED HEALTH CARE EDUCATION/TRAINING PROGRAM

## 2025-08-05 PROCEDURE — 360N000084 HC SURGERY LEVEL 4 W/ FLUORO, PER MIN: Performed by: STUDENT IN AN ORGANIZED HEALTH CARE EDUCATION/TRAINING PROGRAM

## 2025-08-05 PROCEDURE — 250N000011 HC RX IP 250 OP 636: Performed by: NURSE ANESTHETIST, CERTIFIED REGISTERED

## 2025-08-05 PROCEDURE — 64450 NJX AA&/STRD OTHER PN/BRANCH: CPT | Mod: XU,RT

## 2025-08-05 PROCEDURE — 258N000003 HC RX IP 258 OP 636: Performed by: STUDENT IN AN ORGANIZED HEALTH CARE EDUCATION/TRAINING PROGRAM

## 2025-08-05 PROCEDURE — 710N000010 HC RECOVERY PHASE 1, LEVEL 2, PER MIN: Performed by: STUDENT IN AN ORGANIZED HEALTH CARE EDUCATION/TRAINING PROGRAM

## 2025-08-05 PROCEDURE — 370N000017 HC ANESTHESIA TECHNICAL FEE, PER MIN: Performed by: STUDENT IN AN ORGANIZED HEALTH CARE EDUCATION/TRAINING PROGRAM

## 2025-08-05 DEVICE — IMPLANTABLE DEVICE: Type: IMPLANTABLE DEVICE | Site: ANKLE | Status: FUNCTIONAL

## 2025-08-05 DEVICE — IMP SCR ARTHREX CORTICAL 3.5MMX30MM AR-8935-30: Type: IMPLANTABLE DEVICE | Site: ANKLE | Status: FUNCTIONAL

## 2025-08-05 DEVICE — SCREW BN 16MM 3.5MM TI NS KREULOCK LF: Type: IMPLANTABLE DEVICE | Site: ANKLE | Status: FUNCTIONAL

## 2025-08-05 DEVICE — IMP SCR ARTHREX CORTICAL 3.5MMX14MM AR-8935-14: Type: IMPLANTABLE DEVICE | Site: ANKLE | Status: FUNCTIONAL

## 2025-08-05 DEVICE — SCREW BN 18MM 3MM TI VA NS KREULOCK LF AR-8933VCL-18: Type: IMPLANTABLE DEVICE | Site: ANKLE | Status: FUNCTIONAL

## 2025-08-05 DEVICE — IMP SCREW BONE KREULOCK 3X20MM AR-8933VCL-20: Type: IMPLANTABLE DEVICE | Site: ANKLE | Status: FUNCTIONAL

## 2025-08-05 DEVICE — SCREW TI 3.5X12MM: Type: IMPLANTABLE DEVICE | Site: ANKLE | Status: FUNCTIONAL

## 2025-08-05 DEVICE — IMP SCR ARTHREX CORTICAL 3.5MMX26MM AR-8935-26: Type: IMPLANTABLE DEVICE | Site: ANKLE | Status: FUNCTIONAL

## 2025-08-05 DEVICE — SCREW BN 22MM 3MM TI VA NS KREULOCK LF AR-8933VCL-22: Type: IMPLANTABLE DEVICE | Site: ANKLE | Status: FUNCTIONAL

## 2025-08-05 RX ORDER — LIDOCAINE HYDROCHLORIDE 10 MG/ML
INJECTION, SOLUTION INFILTRATION; PERINEURAL PRN
Status: DISCONTINUED | OUTPATIENT
Start: 2025-08-05 | End: 2025-08-05

## 2025-08-05 RX ORDER — PROPOFOL 10 MG/ML
INJECTION, EMULSION INTRAVENOUS CONTINUOUS PRN
Status: DISCONTINUED | OUTPATIENT
Start: 2025-08-05 | End: 2025-08-05

## 2025-08-05 RX ORDER — OXYCODONE HYDROCHLORIDE 5 MG/1
5-10 TABLET ORAL EVERY 4 HOURS PRN
Qty: 16 TABLET | Refills: 0 | Status: SHIPPED | OUTPATIENT
Start: 2025-08-05

## 2025-08-05 RX ORDER — OXYCODONE HYDROCHLORIDE 5 MG/1
5 TABLET ORAL
Status: DISCONTINUED | OUTPATIENT
Start: 2025-08-05 | End: 2025-08-05 | Stop reason: HOSPADM

## 2025-08-05 RX ORDER — HYDROMORPHONE HCL IN WATER/PF 6 MG/30 ML
0.2 PATIENT CONTROLLED ANALGESIA SYRINGE INTRAVENOUS EVERY 5 MIN PRN
Status: DISCONTINUED | OUTPATIENT
Start: 2025-08-05 | End: 2025-08-05 | Stop reason: HOSPADM

## 2025-08-05 RX ORDER — TRANEXAMIC ACID 650 MG/1
1950 TABLET ORAL ONCE
Status: COMPLETED | OUTPATIENT
Start: 2025-08-05 | End: 2025-08-05

## 2025-08-05 RX ORDER — LIDOCAINE 40 MG/G
CREAM TOPICAL
Status: DISCONTINUED | OUTPATIENT
Start: 2025-08-05 | End: 2025-08-05 | Stop reason: HOSPADM

## 2025-08-05 RX ORDER — FENTANYL CITRATE 50 UG/ML
25 INJECTION, SOLUTION INTRAMUSCULAR; INTRAVENOUS EVERY 5 MIN PRN
Status: DISCONTINUED | OUTPATIENT
Start: 2025-08-05 | End: 2025-08-05 | Stop reason: HOSPADM

## 2025-08-05 RX ORDER — FLUMAZENIL 0.1 MG/ML
0.2 INJECTION, SOLUTION INTRAVENOUS
Status: DISCONTINUED | OUTPATIENT
Start: 2025-08-05 | End: 2025-08-05 | Stop reason: HOSPADM

## 2025-08-05 RX ORDER — NALOXONE HYDROCHLORIDE 0.4 MG/ML
0.1 INJECTION, SOLUTION INTRAMUSCULAR; INTRAVENOUS; SUBCUTANEOUS
Status: DISCONTINUED | OUTPATIENT
Start: 2025-08-05 | End: 2025-08-05 | Stop reason: HOSPADM

## 2025-08-05 RX ORDER — IPRATROPIUM BROMIDE AND ALBUTEROL SULFATE 2.5; .5 MG/3ML; MG/3ML
3 SOLUTION RESPIRATORY (INHALATION)
Status: COMPLETED | OUTPATIENT
Start: 2025-08-05 | End: 2025-08-05

## 2025-08-05 RX ORDER — ONDANSETRON 4 MG/1
4 TABLET, ORALLY DISINTEGRATING ORAL EVERY 30 MIN PRN
Status: DISCONTINUED | OUTPATIENT
Start: 2025-08-05 | End: 2025-08-05 | Stop reason: HOSPADM

## 2025-08-05 RX ORDER — SODIUM CHLORIDE, SODIUM LACTATE, POTASSIUM CHLORIDE, CALCIUM CHLORIDE 600; 310; 30; 20 MG/100ML; MG/100ML; MG/100ML; MG/100ML
INJECTION, SOLUTION INTRAVENOUS CONTINUOUS
Status: DISCONTINUED | OUTPATIENT
Start: 2025-08-05 | End: 2025-08-05 | Stop reason: HOSPADM

## 2025-08-05 RX ORDER — ONDANSETRON 2 MG/ML
4 INJECTION INTRAMUSCULAR; INTRAVENOUS EVERY 30 MIN PRN
Status: DISCONTINUED | OUTPATIENT
Start: 2025-08-05 | End: 2025-08-05 | Stop reason: HOSPADM

## 2025-08-05 RX ORDER — AMOXICILLIN 250 MG
1-2 CAPSULE ORAL 2 TIMES DAILY
Qty: 30 TABLET | Refills: 0 | Status: SHIPPED | OUTPATIENT
Start: 2025-08-05

## 2025-08-05 RX ORDER — BUPIVACAINE HYDROCHLORIDE 5 MG/ML
INJECTION, SOLUTION EPIDURAL; INTRACAUDAL; PERINEURAL
Status: COMPLETED | OUTPATIENT
Start: 2025-08-05 | End: 2025-08-05

## 2025-08-05 RX ORDER — DEXAMETHASONE SODIUM PHOSPHATE 4 MG/ML
4 INJECTION, SOLUTION INTRA-ARTICULAR; INTRALESIONAL; INTRAMUSCULAR; INTRAVENOUS; SOFT TISSUE
Status: DISCONTINUED | OUTPATIENT
Start: 2025-08-05 | End: 2025-08-05 | Stop reason: HOSPADM

## 2025-08-05 RX ORDER — MAGNESIUM HYDROXIDE 1200 MG/15ML
LIQUID ORAL PRN
Status: DISCONTINUED | OUTPATIENT
Start: 2025-08-05 | End: 2025-08-05 | Stop reason: HOSPADM

## 2025-08-05 RX ORDER — UMECLIDINIUM BROMIDE AND VILANTEROL TRIFENATATE 62.5; 25 UG/1; UG/1
1 POWDER RESPIRATORY (INHALATION) DAILY
COMMUNITY

## 2025-08-05 RX ORDER — CEFAZOLIN SODIUM/WATER 2 G/20 ML
2 SYRINGE (ML) INTRAVENOUS SEE ADMIN INSTRUCTIONS
Status: DISCONTINUED | OUTPATIENT
Start: 2025-08-05 | End: 2025-08-05 | Stop reason: HOSPADM

## 2025-08-05 RX ORDER — CEFAZOLIN SODIUM/WATER 2 G/20 ML
2 SYRINGE (ML) INTRAVENOUS
Status: COMPLETED | OUTPATIENT
Start: 2025-08-05 | End: 2025-08-05

## 2025-08-05 RX ORDER — ACETAMINOPHEN 325 MG/1
650 TABLET ORAL
Status: DISCONTINUED | OUTPATIENT
Start: 2025-08-05 | End: 2025-08-05 | Stop reason: HOSPADM

## 2025-08-05 RX ORDER — HYDROMORPHONE HCL IN WATER/PF 6 MG/30 ML
0.4 PATIENT CONTROLLED ANALGESIA SYRINGE INTRAVENOUS EVERY 5 MIN PRN
Status: DISCONTINUED | OUTPATIENT
Start: 2025-08-05 | End: 2025-08-05 | Stop reason: HOSPADM

## 2025-08-05 RX ORDER — OXYCODONE HYDROCHLORIDE 5 MG/1
5 TABLET ORAL EVERY 6 HOURS PRN
COMMUNITY

## 2025-08-05 RX ORDER — OXYCODONE HYDROCHLORIDE 5 MG/1
10 TABLET ORAL
Status: DISCONTINUED | OUTPATIENT
Start: 2025-08-05 | End: 2025-08-05 | Stop reason: HOSPADM

## 2025-08-05 RX ORDER — PROPOFOL 10 MG/ML
INJECTION, EMULSION INTRAVENOUS PRN
Status: DISCONTINUED | OUTPATIENT
Start: 2025-08-05 | End: 2025-08-05

## 2025-08-05 RX ORDER — FENTANYL CITRATE 50 UG/ML
25-100 INJECTION, SOLUTION INTRAMUSCULAR; INTRAVENOUS
Status: DISCONTINUED | OUTPATIENT
Start: 2025-08-05 | End: 2025-08-05 | Stop reason: HOSPADM

## 2025-08-05 RX ORDER — FENTANYL CITRATE 50 UG/ML
50 INJECTION, SOLUTION INTRAMUSCULAR; INTRAVENOUS EVERY 5 MIN PRN
Status: DISCONTINUED | OUTPATIENT
Start: 2025-08-05 | End: 2025-08-05 | Stop reason: HOSPADM

## 2025-08-05 RX ORDER — ACETAMINOPHEN 325 MG/1
975 TABLET ORAL ONCE
Status: COMPLETED | OUTPATIENT
Start: 2025-08-05 | End: 2025-08-05

## 2025-08-05 RX ADMIN — FENTANYL CITRATE 25 MCG: 50 INJECTION INTRAMUSCULAR; INTRAVENOUS at 10:55

## 2025-08-05 RX ADMIN — Medication 2 G: at 07:25

## 2025-08-05 RX ADMIN — BUPIVACAINE HYDROCHLORIDE 10 ML: 5 INJECTION, SOLUTION EPIDURAL; INTRACAUDAL; PERINEURAL at 07:14

## 2025-08-05 RX ADMIN — SODIUM CHLORIDE, SODIUM LACTATE, POTASSIUM CHLORIDE, AND CALCIUM CHLORIDE: .6; .31; .03; .02 INJECTION, SOLUTION INTRAVENOUS at 06:37

## 2025-08-05 RX ADMIN — IPRATROPIUM BROMIDE AND ALBUTEROL SULFATE 3 ML: .5; 3 SOLUTION RESPIRATORY (INHALATION) at 11:39

## 2025-08-05 RX ADMIN — LIDOCAINE HYDROCHLORIDE 50 MG: 10 INJECTION, SOLUTION INFILTRATION; PERINEURAL at 07:35

## 2025-08-05 RX ADMIN — ACETAMINOPHEN 975 MG: 325 TABLET ORAL at 06:04

## 2025-08-05 RX ADMIN — MIDAZOLAM 1 MG: 1 INJECTION INTRAMUSCULAR; INTRAVENOUS at 07:30

## 2025-08-05 RX ADMIN — PROPOFOL 150 MCG/KG/MIN: 10 INJECTION, EMULSION INTRAVENOUS at 07:35

## 2025-08-05 RX ADMIN — TRANEXAMIC ACID 1950 MG: 650 TABLET ORAL at 06:03

## 2025-08-05 RX ADMIN — FENTANYL CITRATE 50 MCG: 50 INJECTION INTRAMUSCULAR; INTRAVENOUS at 11:07

## 2025-08-05 RX ADMIN — FENTANYL CITRATE 100 MCG: 50 INJECTION INTRAMUSCULAR; INTRAVENOUS at 07:10

## 2025-08-05 RX ADMIN — PROPOFOL 100 MG: 10 INJECTION, EMULSION INTRAVENOUS at 07:35

## 2025-08-05 RX ADMIN — BUPIVACAINE HYDROCHLORIDE 20 ML: 5 INJECTION, SOLUTION EPIDURAL; INTRACAUDAL at 07:11

## 2025-08-05 ASSESSMENT — ACTIVITIES OF DAILY LIVING (ADL)
ADLS_ACUITY_SCORE: 32

## 2025-08-05 ASSESSMENT — LIFESTYLE VARIABLES: TOBACCO_USE: 1

## 2025-08-05 ASSESSMENT — COPD QUESTIONNAIRES: COPD: 1

## (undated) DEVICE — SOLUTION WATER 1000ML BOTTLE R5000-01

## (undated) DEVICE — DRAPE C-ARMOR 5 SIDED 5523

## (undated) DEVICE — GLOVE SURGEON PI ORTHO SZ 7 LF

## (undated) DEVICE — CUFF TOURN 30IN STRL DISP 5921030235

## (undated) DEVICE — GLOVE BIOGEL PI ULTRATOUCH G SZ 6.0 42160

## (undated) DEVICE — BANDAGE ELASTIC 4X550 LF DBL 593-94LF

## (undated) DEVICE — SU ETHILON 3-0 PS-2 18" 1669H

## (undated) DEVICE — DRSG GAUZE 4X4" TRAY 6939

## (undated) DEVICE — SUTURE MONOCRYL 3-0 18 PS2 UND MCP497G

## (undated) DEVICE — SOLUTION IRRIGATION 0.9% NACL 1000ML BOTTLE R5200-01

## (undated) DEVICE — Device

## (undated) DEVICE — ELECTRODE PATIENT RETURN ADULT L10 FT 2 PLATE CORD 0855C

## (undated) DEVICE — DRAPE C-ARM 60X42" 1013

## (undated) DEVICE — CAST PADDING 4" STERILE 9044S

## (undated) DEVICE — DRILL BIT ARTHREX BB TAK MTP THREADED AR-13226T

## (undated) DEVICE — CUSTOM PACK LOWER EXTREMITY SOP5BLEHEA

## (undated) DEVICE — IMP SCR ARTHREX CORTICAL 3.5MMX34MM AR-8935-34: Type: IMPLANTABLE DEVICE | Site: ANKLE | Status: NON-FUNCTIONAL

## (undated) DEVICE — IMPLANTABLE DEVICE: Type: IMPLANTABLE DEVICE | Site: ANKLE | Status: NON-FUNCTIONAL

## (undated) DEVICE — SUCTION MANIFOLD NEPTUNE 2 SYS 1 PORT 702-025-000

## (undated) DEVICE — DRILL BIT ARTHREX 2.5MM AR-8943-42

## (undated) DEVICE — GOWN XLG DISP 9545

## (undated) DEVICE — DRAPE STOCKINETTE IMPERVIOUS 12" 1587

## (undated) DEVICE — ESU PENCIL SMOKE EVAC W/ROCKER SWITCH 0703-047-000

## (undated) DEVICE — DRSG ADAPTIC 3X8" 6113

## (undated) DEVICE — PREP CHLORAPREP 26ML TINTED HI-LITE ORANGE 930815

## (undated) DEVICE — BLADE KNIFE SURG 15 371115

## (undated) DEVICE — CAST PLASTER SPLINT XTRA FAST 5X30" 7392

## (undated) DEVICE — DRILL BIT ARTHREX 3.0MM AR-8943-36

## (undated) DEVICE — SOLIDIFIER FLD 3.2OZ  CL-FREE F/ BIOHZRD WASTE 3000ML CANIST

## (undated) DEVICE — GLOVE BIOGEL INDICATOR 7.5 LF 41675

## (undated) DEVICE — DRSG ABD TNDRSRB WET PRUF 8IN X 10IN STRL  9194A

## (undated) DEVICE — GOWN LG DISP 9515

## (undated) DEVICE — GLOVE UNDER INDICATOR PI SZ 6.5 LF 41665

## (undated) RX ORDER — ONDANSETRON 2 MG/ML
INJECTION INTRAMUSCULAR; INTRAVENOUS
Status: DISPENSED
Start: 2025-08-05

## (undated) RX ORDER — LIDOCAINE HYDROCHLORIDE 10 MG/ML
INJECTION, SOLUTION EPIDURAL; INFILTRATION; INTRACAUDAL; PERINEURAL
Status: DISPENSED
Start: 2025-08-05

## (undated) RX ORDER — PROPOFOL 10 MG/ML
INJECTION, EMULSION INTRAVENOUS
Status: DISPENSED
Start: 2025-08-05

## (undated) RX ORDER — FENTANYL CITRATE 50 UG/ML
INJECTION, SOLUTION INTRAMUSCULAR; INTRAVENOUS
Status: DISPENSED
Start: 2025-08-05

## (undated) RX ORDER — DEXAMETHASONE SODIUM PHOSPHATE 4 MG/ML
INJECTION, SOLUTION INTRA-ARTICULAR; INTRALESIONAL; INTRAMUSCULAR; INTRAVENOUS; SOFT TISSUE
Status: DISPENSED
Start: 2025-08-05

## (undated) RX ORDER — CEFAZOLIN SODIUM 1 G/3ML
INJECTION, POWDER, FOR SOLUTION INTRAMUSCULAR; INTRAVENOUS
Status: DISPENSED
Start: 2025-08-05